# Patient Record
Sex: MALE | Race: WHITE | NOT HISPANIC OR LATINO | URBAN - METROPOLITAN AREA
[De-identification: names, ages, dates, MRNs, and addresses within clinical notes are randomized per-mention and may not be internally consistent; named-entity substitution may affect disease eponyms.]

---

## 2024-01-05 NOTE — ASU PATIENT PROFILE, ADULT - FALL HARM RISK - UNIVERSAL INTERVENTIONS
Bed in lowest position, wheels locked, appropriate side rails in place/Call bell, personal items and telephone in reach/Instruct patient to call for assistance before getting out of bed or chair/Non-slip footwear when patient is out of bed/[place] to call system/Physically safe environment - no spills, clutter or unnecessary equipment/Purposeful Proactive Rounding/Room/bathroom lighting operational, light cord in reach Bed in lowest position, wheels locked, appropriate side rails in place/Call bell, personal items and telephone in reach/Instruct patient to call for assistance before getting out of bed or chair/Non-slip footwear when patient is out of bed/[place] to call system/Physically safe environment - no spills, clutter or unnecessary equipment/Purposeful Proactive Rounding/Room/bathroom lighting operational, light cord in reach

## 2024-01-05 NOTE — ASU PATIENT PROFILE, ADULT - ABLE TO REACH PT
Voice message : arrival time is at 8:30 am, procedure time is at 10;30 am,, must remain NPO/NO solid foods after  2200 pm on MOnday night, allow to drink water or apple juice till12,2 am , Dress comfortable, leave all valuable at home, . non smoking, non alcohol , no jewelry, no lotions,  need to take blood pressure, prostate, thyroid  medication before arriving to the hospital if apply to you.  NO medicine  for Diabetes,  if takes anticoagulant for pulmonary or cardiac issues , please  call your Cardiologist  for advice  of this medicine , dress comfortable , leave all valuable at home, , Bring ID photo , , insurance cards,  and  escort arranged  need to be with a person  18 years old , address and telephone  given to patient to call as needed,/no

## 2024-01-09 ENCOUNTER — OUTPATIENT (OUTPATIENT)
Dept: OUTPATIENT SERVICES | Facility: HOSPITAL | Age: 44
LOS: 1 days | Discharge: HOME | End: 2024-01-09
Payer: COMMERCIAL

## 2024-01-09 VITALS
TEMPERATURE: 98 F | RESPIRATION RATE: 16 BRPM | WEIGHT: 179.68 LBS | HEART RATE: 70 BPM | SYSTOLIC BLOOD PRESSURE: 120 MMHG | DIASTOLIC BLOOD PRESSURE: 79 MMHG | HEIGHT: 71.5 IN | OXYGEN SATURATION: 98 %

## 2024-01-09 VITALS
HEART RATE: 80 BPM | RESPIRATION RATE: 16 BRPM | OXYGEN SATURATION: 97 % | DIASTOLIC BLOOD PRESSURE: 66 MMHG | SYSTOLIC BLOOD PRESSURE: 136 MMHG

## 2024-01-09 DIAGNOSIS — Z98.890 OTHER SPECIFIED POSTPROCEDURAL STATES: Chronic | ICD-10-CM

## 2024-01-09 PROCEDURE — 88300 SURGICAL PATH GROSS: CPT | Mod: 26,59

## 2024-01-09 PROCEDURE — 88305 TISSUE EXAM BY PATHOLOGIST: CPT | Mod: 26

## 2024-01-09 PROCEDURE — 88311 DECALCIFY TISSUE: CPT | Mod: 26

## 2024-01-09 DEVICE — SPLINT INTRANASAL POSISEP X 0.6X2IN: Type: IMPLANTABLE DEVICE | Status: FUNCTIONAL

## 2024-01-09 RX ORDER — SODIUM CHLORIDE 9 MG/ML
500 INJECTION, SOLUTION INTRAVENOUS
Refills: 0 | Status: DISCONTINUED | OUTPATIENT
Start: 2024-01-09 | End: 2024-01-09

## 2024-01-09 RX ORDER — CLONAZEPAM 1 MG
1 TABLET ORAL
Refills: 0 | DISCHARGE

## 2024-01-09 RX ORDER — MORPHINE SULFATE 50 MG/1
2 CAPSULE, EXTENDED RELEASE ORAL
Refills: 0 | Status: DISCONTINUED | OUTPATIENT
Start: 2024-01-09 | End: 2024-01-09

## 2024-01-09 RX ORDER — ACETAMINOPHEN 500 MG
1000 TABLET ORAL ONCE
Refills: 0 | Status: COMPLETED | OUTPATIENT
Start: 2024-01-09 | End: 2024-01-09

## 2024-01-09 RX ADMIN — Medication 400 MILLIGRAM(S): at 15:50

## 2024-01-09 NOTE — ASU DISCHARGE PLAN (ADULT/PEDIATRIC) - PROCEDURE
Septoplasty, nasal fracture reduction, [facility] IT BL, RIGHT maxillary antrostomy Septoplasty, nasal fracture reduction, [facility] IT BL, RIGHT maxillary antrostomy Septoplasty, nasal fracture reduction, [facility] IT BL, RIGHT maxillary antrostomy Septoplasty, nasal fracture reduction, [facility] IT BL, RIGHT maxillary antrostomy

## 2024-01-09 NOTE — ASU DISCHARGE PLAN (ADULT/PEDIATRIC) - NS MD DC FALL RISK RISK
For information on Fall & Injury Prevention, visit: [email or web address] OR  [email or web address]-injury OR  https://www.cdc.gov/steadi/patient.html For information on Fall & Injury Prevention, visit: [email or web address] OR  [email or web address]-tips-to-avoid-injury OR  https://www.cdc.gov/steadi/patient.html

## 2024-01-09 NOTE — PRE-ANESTHESIA EVALUATION ADULT - NSANTHOSAYNRD_GEN_A_CORE
No. MEREDITH screening performed.  STOP BANG Legend: 0-2 = LOW Risk; 3-4 = INTERMEDIATE Risk; 5-8 = HIGH Risk
No significant past surgical history

## 2024-01-09 NOTE — BRIEF OPERATIVE NOTE - OPERATION/FINDINGS
Severe leftward septal deviation and left nasal bone prominence and deviation; severe right sided atelectactic sinus with opacificaiton and inspissated secretions.

## 2024-01-09 NOTE — BRIEF OPERATIVE NOTE - NSICDXBRIEFPROCEDURE_GEN_ALL_CORE_FT
PROCEDURES:  Septoplasty, nose 09-Jan-2024 15:06:25  Adilson Leal  Open reduction of nasal fracture with stabilization using internal splint 09-Jan-2024 15:06:49  Adilson Leal  Resection, submucosal, inferior nasal turbinate 09-Jan-2024 15:06:57  Adilson Leal  Maxillary antrostomy, with tissue removal 09-Jan-2024 15:07:04  Adilson Leal

## 2024-01-23 LAB — SURGICAL PATHOLOGY STUDY: SIGNIFICANT CHANGE UP

## (undated) DEVICE — SUT PROLENE 6-0 18" P-1

## (undated) DEVICE — BLADE MEDTRONIC ENT RAD 90 DEGREE ROTATABLE 3.5MM X 11CM

## (undated) DEVICE — SUT PROLENE 3-0 18" PS-2

## (undated) DEVICE — PREP BETADINE SPONGE STICKS

## (undated) DEVICE — MEDTRONIC INSTRUMENT TRACKER ENT

## (undated) DEVICE — SUT PLAIN GUT 4-0 18" P-3

## (undated) DEVICE — TUBING SUCTION NONCONDUCTIVE 6MM X 12FT

## (undated) DEVICE — SUT PDS II 5-0 27" RB-1

## (undated) DEVICE — SUT PLAIN GUT FAST ABSORBING 5-0 PC-1

## (undated) DEVICE — GLV 7.5 PROTEXIS (WHITE)

## (undated) DEVICE — DRSG GAUZE SPONGE 2X2" STERILE

## (undated) DEVICE — MEDTRONIC AXIEM PATIENT TRACKER NON-INVASIVE

## (undated) DEVICE — DRSG TEGADERM 2.5X3"

## (undated) DEVICE — DRAPE MAYO STAND 23"

## (undated) DEVICE — PACK RHINOPLASTY

## (undated) DEVICE — SUT CHROMIC 4-0 18" G-3

## (undated) DEVICE — ELCTR COLORADO 3CM

## (undated) DEVICE — ELCTR BOVIE PENCIL BLADE 10FT

## (undated) DEVICE — BLADE MEDTRONIC ENT FUSION QUADCUT ROTATABLE STRAIGHT 4.3MM X 13CM

## (undated) DEVICE — SUT PLAIN GUT 4-0 18" SC-1

## (undated) DEVICE — SOL ANTI FOG

## (undated) DEVICE — SUT PDS II 4-0 18" P-3

## (undated) DEVICE — SUT CHROMIC 4-0 27" RB-1

## (undated) DEVICE — S&N ARTHROCARE ENT WAND TURBINATOR

## (undated) DEVICE — DRSG AQUAPLAST BLANK BLUSH 3 X 3"

## (undated) DEVICE — VENODYNE/SCD SLEEVE CALF MEDIUM

## (undated) DEVICE — Device

## (undated) DEVICE — APPLICATOR COTTON TIP 6"

## (undated) DEVICE — PETRI DISH MED 3.5"

## (undated) DEVICE — MARKING PEN W RULER

## (undated) DEVICE — SUT CHROMIC 6-0 18" S-14

## (undated) DEVICE — WARMING BLANKET LOWER ADULT

## (undated) DEVICE — BLADE MEDTRONIC ENT INFERIOR TURBINATE ROTATABLE STRAIGHT 2MM X11CM